# Patient Record
Sex: FEMALE | Race: WHITE
[De-identification: names, ages, dates, MRNs, and addresses within clinical notes are randomized per-mention and may not be internally consistent; named-entity substitution may affect disease eponyms.]

---

## 2018-04-06 ENCOUNTER — HOSPITAL ENCOUNTER (OUTPATIENT)
Dept: HOSPITAL 80 - FCPNEURO | Age: 78
End: 2018-04-06
Attending: PSYCHIATRY & NEUROLOGY
Payer: COMMERCIAL

## 2018-04-06 DIAGNOSIS — G47.33: Primary | ICD-10-CM

## 2018-04-06 DIAGNOSIS — G47.37: ICD-10-CM

## 2018-04-06 DIAGNOSIS — G47.61: ICD-10-CM

## 2018-04-06 DIAGNOSIS — G47.39: ICD-10-CM

## 2018-04-12 ENCOUNTER — HOSPITAL ENCOUNTER (OUTPATIENT)
Dept: HOSPITAL 80 - FED | Age: 78
Setting detail: OBSERVATION
Discharge: HOME | End: 2018-04-12
Attending: INTERNAL MEDICINE | Admitting: INTERNAL MEDICINE
Payer: COMMERCIAL

## 2018-04-12 VITALS — SYSTOLIC BLOOD PRESSURE: 138 MMHG | DIASTOLIC BLOOD PRESSURE: 61 MMHG

## 2018-04-12 DIAGNOSIS — T38.3X1A: ICD-10-CM

## 2018-04-12 DIAGNOSIS — Z98.890: ICD-10-CM

## 2018-04-12 DIAGNOSIS — E16.0: ICD-10-CM

## 2018-04-12 DIAGNOSIS — E11.649: Primary | ICD-10-CM

## 2018-04-12 DIAGNOSIS — I10: ICD-10-CM

## 2018-04-12 DIAGNOSIS — E86.9: ICD-10-CM

## 2018-04-12 DIAGNOSIS — Z88.2: ICD-10-CM

## 2018-04-12 DIAGNOSIS — Z79.4: ICD-10-CM

## 2018-04-12 LAB — PLATELET # BLD: 269 10^3/UL (ref 150–400)

## 2018-04-12 PROCEDURE — 93005 ELECTROCARDIOGRAM TRACING: CPT

## 2018-04-12 PROCEDURE — G0378 HOSPITAL OBSERVATION PER HR: HCPCS

## 2018-04-12 NOTE — GDS
[f rep st]



                                                             DISCHARGE SUMMARY





DISCHARGE DIAGNOSES:  

1.  Hypoglycemia.

2.  Type 2 diabetes.

3.  Hypertension.



PHYSICAL EXAM:  GENERAL:  The patient is alert.  VITAL SIGNS:  Afebrile at 37, pulse is 81, respirato
ry rate is 16, blood pressure is 138/61.  She is saturating 89% on room air.  I have seen and evaluat
ed the patient on the day of discharge.



HOSPITAL COURSE:  Patient is a very pleasant 77-year-old female who presented to the emergency room w
ith complaints of acute confusion.  After evaluation, it was noted the patient was hypoglycemic.  Thi
s was in the setting of accidental 2nd insulin dosing in 1 day.  The patient's hypoglycemia has compl
etely resolved.  She has been recommended to continue her previously prescribed Lantus dose of 30 ton
ight, as well as her a.c. dosing.  She is tolerating a regular diet.  She has no further complaints, 
and she will be discharged home.  There are no pending studies.



DISCHARGE MEDICATIONS:  Please refer to EMR form.  I have not adjusted the patient's previously presc
ribed home medications.



FOLLOWUP:  will be with her primary care physician, Dr. Herminio Barahona.





Job #:  [id number]

## 2018-04-12 NOTE — ASMTCMCOM
CM Note

 

CM Note                       

Notes:

Spoke w/RN, pt will dc home w/support of . CM available for any changes. 



DC Plan: Independent

 

Date Signed:  04/12/2018 12:34 PM

Electronically Signed By:Cyndi Le RN

## 2018-04-12 NOTE — CPEKG
Heart Rate: 60

RR Interval: 1000

P-R Interval: 156

QRSD Interval: 88

QT Interval: 484

QTC Interval: 484

P Axis: 14

QRS Axis: -5

T Wave Axis: 67

EKG Severity - BORDERLINE ECG -

EKG Impression: SINUS RHYTHM

EKG Impression: CONSIDER ANTERIOR INFARCT

Electronically Signed By: Jane Castrejon 13-Apr-2018 12:30:45

## 2018-04-12 NOTE — PDGENHP
History and Physical





- Chief Complaint


Hypoglycemia





- History of Present Illness


78 yo F w/ IDDM and HTN presents with hypoglycemia. Patient was discharged from 

the hospital a few weeks ago after an appendectomy. On discharge her insulin 

regimen was changed to insulin glargine 22 u qAM + sliding scale at meal times. 

She went to see her physician Dr. Penn yesterday who changed her regimen to 

insulin glargine 30 u qHS + sliding scale. She ended up taking both long acting 

doses yesterday for a total of 52 units of glargine in addition to 24 units of 

aspart.  noted her to be altered last night then unresponsive. She was 

found by EMS with critically low BG and brought to ED.





History Information





- Allergies/Home Medication List


Allergies/Adverse Reactions: 








codeine Allergy (Verified 04/12/18 02:47)


 


Sulfa (Sulfonamide Antibiotics) Allergy (Verified 04/12/18 02:47)


 





Home Medications: 








Bisacodyl  04/12/18 [Last Taken Unknown]


Lantus Solostar 10 04/12/18 [Last Taken Unknown]


Losartan Potassium 50 DAILY 04/12/18 [Last Taken Unknown]


Metformin 1000 mg DAILY 04/12/18 [Last Taken Unknown]


Novolog Flexpen 4 TID 04/12/18 [Last Taken Unknown]


Omeprazole 40 DAILY 04/12/18 [Last Taken Unknown]


Ondansetron Odt 4 mg PRN PRN 04/12/18 [Last Taken Unknown]


Simvastatin 40 mg DAILY 04/12/18 [Last Taken Unknown]


Venlafaxine 75MG (*) 75 mg 04/12/18 [Last Taken Unknown]


guaiFENesin  04/12/18 [Last Taken Unknown]





I have personally reviewed and updated: family history, medical history





- Past Medical History


diabetes type 2, hypertension





- Surgical History


Reports: appendectomy





- Family History


Positive for: diabetes type II





- Social History


Smoking Status: Never smoked





Review of Systems


Review of Systems: 





ROS: 10pt was reviewed & negative except for what was stated in HPI & below





Physical Exam


Physical Exam: 

















Temp Pulse Resp BP Pulse Ox


 


 36.6 C   62   16   143/53 H  97 


 


 04/12/18 04:00  04/12/18 04:00  04/12/18 04:00  04/12/18 04:00  04/12/18 04:00











Constitutional: no apparent distress, not in pain, obese


Eyes: PERRL, EOMI


Ears, Nose, Mouth, Throat: moist mucous membranes, no oral mucosal ulcers


Cardiovascular: regular rate and rhythym, no murmur, rub, or gallop


Respiratory: no respiratory distress, clear to auscultation


Gastrointestinal: normoactive bowel sounds, soft, non-tender abdomen, other (

Well healing surgical incision RLQ)


Skin: warm, normal color


Musculoskeletal: full muscle strength, no muscle tenderness


Neurologic: AAOx3, CN II-XII Intact





Lab Data & Imaging Review





 04/12/18 02:45





 04/12/18 02:45














WBC  5.80 10^3/uL (3.80-9.50)   04/12/18  02:45    


 


RBC  4.34 10^6/uL (4.18-5.33)   04/12/18  02:45    


 


Hgb  13.2 g/dL (12.6-16.3)   04/12/18  02:45    


 


Hct  39.5 % (38.0-47.0)   04/12/18  02:45    


 


MCV  91.0 fL (81.5-99.8)   04/12/18  02:45    


 


MCH  30.4 pg (27.9-34.1)   04/12/18  02:45    


 


MCHC  33.4 g/dL (32.4-36.7)   04/12/18  02:45    


 


RDW  13.6 % (11.5-15.2)   04/12/18  02:45    


 


Plt Count  269 10^3/uL (150-400)   04/12/18  02:45    


 


MPV  10.0 fL (8.7-11.7)   04/12/18  02:45    


 


Neut % (Auto)  38.8 % (39.3-74.2)  L  04/12/18  02:45    


 


Lymph % (Auto)  49.3 % (15.0-45.0)  H  04/12/18  02:45    


 


Mono % (Auto)  8.1 % (4.5-13.0)   04/12/18  02:45    


 


Eos % (Auto)  3.1 % (0.6-7.6)   04/12/18  02:45    


 


Baso % (Auto)  0.2 % (0.3-1.7)  L  04/12/18  02:45    


 


Nucleat RBC Rel Count  0.0 % (0.0-0.2)   04/12/18  02:45    


 


Absolute Neuts (auto)  2.25 10^3/uL (1.70-6.50)   04/12/18  02:45    


 


Absolute Lymphs (auto)  2.86 10^3/uL (1.00-3.00)   04/12/18  02:45    


 


Absolute Monos (auto)  0.47 10^3/uL (0.30-0.80)   04/12/18  02:45    


 


Absolute Eos (auto)  0.18 10^3/uL (0.03-0.40)   04/12/18  02:45    


 


Absolute Basos (auto)  0.01 10^3/uL (0.02-0.10)  L  04/12/18  02:45    


 


Absolute Nucleated RBC  0.00 10^3/uL (0-0.01)   04/12/18  02:45    


 


Immature Gran %  0.5 % (0.0-1.1)   04/12/18  02:45    


 


Immature Gran #  0.03 10^3/uL (0.00-0.10)   04/12/18  02:45    


 


Sodium  143 mEq/L (135-145)   04/12/18  02:45    


 


Potassium  3.5 mEq/L (3.5-5.2)   04/12/18  02:45    


 


Chloride  101 mEq/L ()   04/12/18  02:45    


 


Carbon Dioxide  29 mEq/l (22-31)   04/12/18  02:45    


 


Anion Gap  13 mEq/L (8-16)   04/12/18  02:45    


 


BUN  33 mg/dL (7-23)  H  04/12/18  02:45    


 


Creatinine  0.9 mg/dL (0.6-1.0)   04/12/18  02:45    


 


Estimated GFR  > 60   04/12/18  02:45    


 


Glucose  62 mg/dL ()  L  04/12/18  02:45    


 


POC Glucose  118 mg/dL ()  H  04/12/18  04:14    


 


Calcium  9.8 mg/dL (8.5-10.4)   04/12/18  02:45    


 


Total Bilirubin  0.3 mg/dL (0.1-1.4)   04/12/18  02:45    


 


Conjugated Bilirubin  0.2 mg/dL (0.0-0.5)   04/12/18  02:45    


 


Unconjugated Bilirubin  0.1 mg/dL (0.0-1.1)   04/12/18  02:45    


 


AST  26 IU/L (14-46)   04/12/18  02:45    


 


ALT  24 IU/L (9-52)   04/12/18  02:45    


 


Alkaline Phosphatase  94 IU/L ()   04/12/18  02:45    


 


Troponin I  < 0.012 ng/mL (0.000-0.034)   04/12/18  02:45    


 


Total Protein  8.0 g/dL (6.3-8.2)   04/12/18  02:45    


 


Albumin  4.0 g/dL (3.5-5.0)   04/12/18  02:45    


 


Lipase  81 IU/L ()   04/12/18  02:45    











Assessment & Plan


Assessment: 








78 yo F w/ DM and HTN presents w/ hypoglycemia.








Plan: 


1. Hypoglycemia - 2/2 accidental insulin overdose. Her glargine dose was 

changed from qAM to qPM yesterday and she ended up taking both for a total of  

52 units of insulin glargine. She also took 24 units of aspart (8u qAC x3). BG 

now >100 after glucose and food in the ED.


- Admit for observation


- q2H BG checks


- Will hold further insulin until convincingly out of dangerous window noting 

high dose of long acting insulin


2. T2DM - Regimen changed to insulin glargine 30u qPM + SSI yesterday; will 

manage acutely as above.


3. HTN - Continue home meds


4. Recent appendectomy - Surgical scar in RLQ healing well.





Diet - Regular


Code - Full


Ppx - LMWH


Dispo - Admit under observation status

## 2018-04-12 NOTE — EDPHY
H & P


Time Seen by Provider: 04/12/18 02:42


HPI/ROS: 





HPI





CHIEF COMPLAINT:  Hypoglycemia





HISTORY OF PRESENT ILLNESS:  Patient is a 77-year-old female she is insulin-

dependent diabetic she presents emergency room by EMS after her  called 

911 for unresponsiveness and critically low blood sugar of 31. The patient did 

feel that her blood sugar was getting low is a contacted her  when her 

 went evaluated her she was diaphoretic and appeared to be confused.  He 

tried to give her oral glucose however blood sugar got too low and she was 

unresponsive.  911 was called 911 instructed him to do CPR he did a brief 

period of CPR.  Less than 1 min.  EMS arrived final blood sugar at 31.  Gave 

dextrose to IV and she became more responsive.


According to the  she has had a ruptured appendix surgery at [facility] by Dr. Isaac arora.  She was released approximately 2 weeks ago.  

Over the past 2 weeks she has had difficulty controlling her blood sugar.


She reports that she took approximately 30 units of long-acting insulin tonight 

however her blood sugar was only 120.


She did eat dinner tonight.





Patient reports she may have taken her long-acting insulin 20 units this 

morning as well as additional 30 units of long-acting insulin tonight for total 

of 50 some units of long-acting insulin.  She reports her blood sugars have 

been running low recently in the 50s to 60s.








It is now 245 in the morning she presents emergency room alert and oriented she 

states that earlier she felt fine but her blood sugar got too low.  She denies 

any chest pain shortness of breath or headache.  Mentating appropriately.





Additionally the  she reports that she did see her endocrinologist today 

and has had some changes with her diabetic medication.  She is unsure exactly 

what was changed.





 is at bedside.





  


Past Medical History:  Insulin-dependent diabetes





Past Surgical History:  Recent ruptured appendix requiring surgery at [facility]





Social History:  Denies drugs alcohol tobacco.





Family History:  Noncontributory








ROS   


REVIEW OF SYSTEMS:


A comprehensive 10 point review of systems is otherwise negative aside from 

elements mentioned in the history of present illness.








Exam   


Constitutional  appears well nontoxic no acute distress, triage nursing summary 

reviewed, vital signs reviewed, awake/alert. 


Eyes   normal conjunctivae and sclera, EOMI, PERRLA. 


HENT   normal inspection, atraumatic, moist mucus membranes, no epistaxis, neck 

supple/ no meningismus, no raccoon eyes. 


Respiratory   clear to auscultation bilaterally, normal breath sounds, no 

respiratory distress, no wheezing. 


Cardiovascular   rate normal, regular rhythm, no murmur, no edema, distal 

pulses normal. 


Gastrointestinal   soft, non-tender, no rebound, no guarding, normal bowel 

sounds, no distension, no pulsatile mass. 


Genitourinary   no CVA tenderness. 


Musculoskeletal  no midline vertebral tenderness, full range of motion, no calf 

swelling, no tenderness of extremities, no meningismus, good pulses, 

neurovascularly intact.


Skin   pink, warm, & dry, no rash, skin atraumatic. 


Neurologic   awake, alert and oriented x 3, AAOx3, moves all 4 extremities 

equally, motor intact, sensory intact, CN II-XII intact, normal cerebellar, 

normal vision, normal speech. 


Psychiatric   normal mood/affect. 


Heme/Lymph/Immune   no lymphadenopathy.





Differential Diagnosis:  Includes but is not limited to in a particular order 

severe hypoglycemia, dehydration, electrolyte disturbance, infection, 

hypoglycemia due to insulin overdose





Medical Decision Making:  Plan for this patient will monitor closely, q.1 hour 

Accu-Cheks glucose chest as she does take long-acting insulin.  Her glucose 

upon arrival was 80. 





Re-evaluation:








EKG interpretation by me on record in [employer] system.  Impression time of 

EKG 2:52 a.m. Sinus rhythm rate of 60 no significant signs of cardiac 

arrhythmia or ST elevation ST depression or significant T-wave abnormalities








0435:  Blood work has been reviewed.  Her blood sugars have stable eyes while 

being in the emergency room after eating a large amount of calories.  I do 

think it is beneficial to admit her today for observation to make sure blood 

sugar stays stable and also for further diabetic and glucose monitoring and 

education.  Her  as well as the patient would feel more comfortable 

being admitted today close observation to have further diabetes education and 

make sure she is on the correct insulin regimen.








0443:  Spoke with Dr. Saha who agrees to admit the patient.


Blood sugars at this time have stabilized.  Patient resting comfortably.


Source: Patient, EMS


Constitutional: 


 Initial Vital Signs











Temperature (C)  36.7 C   04/12/18 02:44


 


Heart Rate  62   04/12/18 02:44


 


Respiratory Rate  16   04/12/18 02:44


 


Blood Pressure  178/82 H  04/12/18 02:44


 


O2 Sat (%)  95   04/12/18 02:44








 











O2 Delivery Mode               Room Air


 


O2 (L/minute)                  2














Allergies/Adverse Reactions: 


 





codeine Allergy (Verified 04/12/18 02:47)


 


Sulfa (Sulfonamide Antibiotics) Allergy (Verified 04/12/18 02:47)


 








Home Medications: 














 Medication  Instructions  Recorded


 


Acetaminophen [Tylenol 325mg (*)] 650 mg PO Q4HRS PRN  tab 04/12/18


 


Calcitonin [Fortical (*)] 1 spray NASAL HS 04/12/18


 


Calcium Carbonate [Oyster Shell 500 mg PO DAILY 04/12/18





Calcium 500 mg (*)]  


 


Cholecalciferol Vit D3 [Vitamin D3 1,000 units PO DAILY 04/12/18





(*)]  


 


Docusate Sodium [Colace 100 MG (*)] 100 mg PO HS 04/12/18


 


Insulin Aspart [novoLOG] 10 unit SC TIDMEAL 04/12/18


 


Insulin Glargine [Lantus 100 30 units SC HS 04/12/18





UNITS/ML (*)]  


 


Losartan Potassium [Cozaar 50 mg 50 mg PO DAILY 04/12/18





(*)]  


 


Ondansetron Odt [Zofran Odt 4 mg 4 mg PO Q4 PRN 04/12/18





(*)]  


 


Simvastatin [Zocor] 40 mg PO DAILY 04/12/18


 


Venlafaxine Xr [Effexor Xr 75MG 75 mg PO DAILY 04/12/18





(*)]  


 


metFORMIN HCL [Glucophage 1000 mg] 1,000 mg PO DAILY 04/12/18














Medical Decision Making





- Data Points


Laboratory Results: 


 Laboratory Results





 04/12/18 02:45 





 04/12/18 02:45 








Medications Given: 


 








Discontinued Medications





Atorvastatin Calcium (Lipitor)  20 mg PO DAILY [facility]


   Stop: 10/09/18 08:59


   Last Admin: 04/12/18 10:33 Dose:  20 mg


Calcium Carbonate (Oyster Shell Calcium)  500 mg PO DAILY [facility]


   Stop: 10/09/18 08:59


   Last Admin: 04/12/18 10:33 Dose:  500 mg


Cholecalciferol (Vitamin D)  1,000 units PO DAILY [facility]


   Stop: 10/09/18 08:59


   Last Admin: 04/12/18 10:33 Dose:  1,000 units


Enoxaparin Sodium (Lovenox)  40 mg SC DAILY [facility]


   Stop: 10/09/18 08:59


   Last Admin: 04/12/18 08:31 Dose:  40 mg


Sodium Chloride (Ns)  1,000 mls @ 0 mls/hr IV EDNOW ONE; Wide Open


   PRN Reason: Protocol


   Stop: 04/12/18 02:43


   Last Admin: 04/12/18 02:50 Dose:  1,000 mls


Insulin Human Lispro (Humalog Lispro)  10 unit SC TIDMEAL [facility]


   Stop: 10/09/18 11:59


   Last Admin: 04/12/18 12:02 Dose:  Not Given


Losartan Potassium (Cozaar)  50 mg PO DAILY [facility]


   Stop: 10/09/18 08:59


   Last Admin: 04/12/18 10:34 Dose:  50 mg


Metformin HCl (Glucophage)  1,000 mg PO DAILY [facility]


   Stop: 10/09/18 08:59


   Last Admin: 04/12/18 10:32 Dose:  1,000 mg


Venlafaxine HCl (Effexor Xr)  75 mg PO DAILY [facility]


   Stop: 10/09/18 08:59


   Last Admin: 04/12/18 10:34 Dose:  75 mg








Departure





- Departure


Disposition: [facility]


Clinical Impression: 


 Hypoglycemia





Condition: Good

## 2018-05-02 ENCOUNTER — HOSPITAL ENCOUNTER (OUTPATIENT)
Dept: HOSPITAL 80 - FCPNEURO | Age: 78
End: 2018-05-02
Attending: PSYCHIATRY & NEUROLOGY
Payer: COMMERCIAL

## 2018-05-02 DIAGNOSIS — G47.33: Primary | ICD-10-CM

## 2018-05-02 DIAGNOSIS — G47.39: ICD-10-CM
